# Patient Record
Sex: FEMALE | Race: WHITE | NOT HISPANIC OR LATINO | Employment: STUDENT | URBAN - METROPOLITAN AREA
[De-identification: names, ages, dates, MRNs, and addresses within clinical notes are randomized per-mention and may not be internally consistent; named-entity substitution may affect disease eponyms.]

---

## 2018-11-17 ENCOUNTER — OFFICE VISIT (OUTPATIENT)
Dept: URGENT CARE | Facility: CLINIC | Age: 23
End: 2018-11-17
Payer: COMMERCIAL

## 2018-11-17 VITALS
WEIGHT: 138 LBS | OXYGEN SATURATION: 99 % | DIASTOLIC BLOOD PRESSURE: 83 MMHG | HEIGHT: 64 IN | RESPIRATION RATE: 16 BRPM | HEART RATE: 79 BPM | BODY MASS INDEX: 23.56 KG/M2 | SYSTOLIC BLOOD PRESSURE: 142 MMHG | TEMPERATURE: 97.8 F

## 2018-11-17 DIAGNOSIS — J01.90 ACUTE SINUSITIS, RECURRENCE NOT SPECIFIED, UNSPECIFIED LOCATION: Primary | ICD-10-CM

## 2018-11-17 PROCEDURE — 99203 OFFICE O/P NEW LOW 30 MIN: CPT | Performed by: PHYSICIAN ASSISTANT

## 2018-11-17 RX ORDER — AMOXICILLIN 875 MG/1
875 TABLET, COATED ORAL 2 TIMES DAILY
Qty: 20 TABLET | Refills: 0 | Status: SHIPPED | OUTPATIENT
Start: 2018-11-17 | End: 2018-11-27

## 2018-11-17 RX ORDER — FEXOFENADINE HCL 180 MG/1
180 TABLET ORAL DAILY
COMMUNITY

## 2018-11-17 NOTE — PROGRESS NOTES
[facility]"[employer] Now    NAME: Estephania Strickland is a 26 y o  female  : 1995    MRN: [id number]  DATE: 2018  TIME: 8:30 AM    Assessment and Plan   Acute sinusitis, recurrence not specified, unspecified location [J01 90]  1  Acute sinusitis, recurrence not specified, unspecified location  amoxicillin (AMOXIL) 875 mg tablet       Patient Instructions     Patient Instructions   I have prescribed an antibiotic for the infection  Please take the antibiotic as prescribed and finish the entire prescription  I recommend that the patient takes an over the counter probiotic or eats yogurt with live cultures in it [place]) to keep good bacteria in the gut and help prevent diarrhea  Wash hands frequently to prevent the spread of infection  Can use over the counter cough and cold medications to help with symptoms  Ibuprofen and/or tylenol as needed for pain or fever  If not improving over the next 7-10 days, follow up with PCP  Chief Complaint     Chief Complaint   Patient presents with    Cough    Cold Like Symptoms       History of Present Illness   53-year-old female here with complaint of ongoing cough and cold symptoms for the last 2 weeks  Has been taking her Allegra for her allergies but does not seem to be helping  Cough is persistent  Worse with laying down  Has constant postnasal drip  No fever or chills  Review of Systems   Review of Systems   Constitutional: Negative for activity change, appetite change, chills, diaphoresis, fatigue, fever and unexpected weight change  HENT: Positive for congestion, postnasal drip, sinus pressure and sore throat  Negative for dental problem, hearing loss, sneezing, tinnitus, trouble swallowing and voice change  Eyes: Negative for photophobia, redness and visual disturbance  Respiratory: Positive for cough  Negative for apnea, chest tightness, shortness of breath, wheezing and stridor      Cardiovascular: Negative for chest pain, palpitations and leg swelling  Gastrointestinal: Negative for abdominal distention, abdominal pain, blood in stool, constipation, diarrhea, nausea and vomiting  Endocrine: Negative for cold intolerance, heat intolerance, polydipsia, polyphagia and polyuria  Genitourinary: Negative for difficulty urinating, dysuria, flank pain, frequency, hematuria and urgency  Musculoskeletal: Negative for arthralgias, back pain, gait problem, joint swelling, myalgias, neck pain and neck stiffness  Skin: Negative for pallor, rash and wound  Neurological: Negative for dizziness, tremors, seizures, speech difficulty, weakness and headaches  Hematological: Negative for adenopathy  Does not bruise/bleed easily  Psychiatric/Behavioral: Negative for agitation, confusion, dysphoric mood and sleep disturbance  The patient is not nervous/anxious  All other systems reviewed and are negative  Current Medications     Current Outpatient Prescriptions:     fexofenadine (ALLEGRA) 180 MG tablet, Take 180 mg by mouth daily, Disp: , Rfl:     mometasone (ASMANEX TWISTHALER) 220 MCG/INH inhaler, Inhale 1 puff every evening Rinse mouth after use , Disp: , Rfl:     amoxicillin (AMOXIL) 875 mg tablet, Take 1 tablet (875 mg total) by mouth 2 (two) times a day for 10 days, Disp: 20 tablet, Rfl: 0    Current Allergies     Allergies as of 11/17/2018    (No Known Allergies)          The following portions of the patient's history were reviewed and updated as appropriate: allergies, current medications, past family history, past medical history, past social history, past surgical history and problem list    Past Medical History:   Diagnosis Date    Allergic     Asthma      History reviewed  No pertinent surgical history  History reviewed  No pertinent family history    Social History     Social History    Marital status: Single     Spouse name: N/A    Number of children: N/A    Years of education: N/A     Occupational History  Not on file  Social History Main Topics    Smoking status: Not on file    Smokeless tobacco: Not on file    Alcohol use Not on file    Drug use: Unknown    Sexual activity: Not on file     Other Topics Concern    Not on file     Social History Narrative    No narrative on file     Medications have been verified  Objective   /83   Pulse 79   Temp 97 8 °F (36 6 °C)   Resp 16   Ht 5' 4" (1 626 m)   Wt 62 6 kg (138 lb)   SpO2 99%   BMI 23 69 kg/m²      Physical Exam   Physical Exam   Constitutional: She appears well-developed and well-nourished  No distress  HENT:   Head: Normocephalic  Right Ear: Tympanic membrane and external ear normal    Left Ear: Tympanic membrane and external ear normal    Nose: Mucosal edema present  Right sinus exhibits maxillary sinus tenderness  Left sinus exhibits maxillary sinus tenderness  Mouth/Throat: Posterior oropharyngeal erythema present  No oropharyngeal exudate  Neck: Normal range of motion  Neck supple  Cardiovascular: Normal rate, regular rhythm and normal heart sounds  No murmur heard  Pulmonary/Chest: Effort normal and breath sounds normal  No respiratory distress  She has no wheezes  She has no rales  Abdominal: Soft  Bowel sounds are normal  There is no tenderness  Musculoskeletal: Normal range of motion  Lymphadenopathy:     She has no cervical adenopathy  Skin: Skin is warm  No rash noted  Nursing note and vitals reviewed

## 2019-08-26 ENCOUNTER — OFFICE VISIT (OUTPATIENT)
Dept: OBGYN CLINIC | Facility: CLINIC | Age: 24
End: 2019-08-26
Payer: COMMERCIAL

## 2019-08-26 VITALS
WEIGHT: 140.8 LBS | SYSTOLIC BLOOD PRESSURE: 120 MMHG | DIASTOLIC BLOOD PRESSURE: 70 MMHG | BODY MASS INDEX: 23.46 KG/M2 | HEIGHT: 65 IN

## 2019-08-26 DIAGNOSIS — Z12.4 CERVICAL CANCER SCREENING: ICD-10-CM

## 2019-08-26 DIAGNOSIS — Z30.41 ORAL CONTRACEPTIVE USE: ICD-10-CM

## 2019-08-26 DIAGNOSIS — Z01.419 ENCOUNTER FOR GYNECOLOGICAL EXAMINATION WITHOUT ABNORMAL FINDING: Primary | ICD-10-CM

## 2019-08-26 PROCEDURE — 99385 PREV VISIT NEW AGE 18-39: CPT | Performed by: OBSTETRICS & GYNECOLOGY

## 2019-08-26 PROCEDURE — G0145 SCR C/V CYTO,THINLAYER,RESCR: HCPCS | Performed by: OBSTETRICS & GYNECOLOGY

## 2019-08-26 RX ORDER — EPINEPHRINE 0.3 MG/.3ML
0.3 INJECTION SUBCUTANEOUS
COMMUNITY
Start: 2018-11-21

## 2019-08-26 RX ORDER — ALBUTEROL SULFATE 90 UG/1
2 AEROSOL, METERED RESPIRATORY (INHALATION) EVERY 4 HOURS PRN
COMMUNITY
Start: 2018-11-21

## 2019-08-26 NOTE — PROGRESS NOTES
CC:  Annual exam    HPI: Reginald Levin presents for routine yearly visit  She is doing well and expresses no complaints or concerns at this time  She had been taking a birth control pill which she stopped for 1 month simply because she ran out of pills  She would like to restart this that she has had no problems or side effects from it  Past Medical History:  Past Medical History:   Diagnosis Date    Allergic     Asthma        Past Surgical History:  Past Surgical History:   Procedure Laterality Date    WISDOM TOOTH EXTRACTION         Past OB/Gyn History:    Menstrual cycles are regular with normal bleeding  Denies any history of sexually transmitted infection  No history of abnormal pap smears       ALLERGIES:   Allergies   Allergen Reactions    Other      Avocado, almond, cherry, pea and ilia   Not clear if only pollen food syndrome or if at risk for anaphylaxis so epi available just in case  Advised on 11/21/2108 to reintroduce all foods except for ilia  Intermittent hx of mouth itching with apple       MEDS:   Current Outpatient Medications:     albuterol (PROVENTIL HFA,VENTOLIN HFA) 90 mcg/act inhaler    EPINEPHrine (EPIPEN) 0 3 mg/0 3 mL SOAJ    Mometasone Furoate (ASMANEX HFA) 200 MCG/ACT AERO    fexofenadine (ALLEGRA) 180 MG tablet    mometasone (ASMANEX TWISTHALER) 220 MCG/INH inhaler    norethindrone-ethinyl estradiol-iron (ESTROSTEP FE) 1-20/1-30/1-35 MG-MCG TABS    Family History:  Family History   Problem Relation Age of Onset    No Known Problems Mother     No Known Problems Father     No Known Problems Sister     No Known Problems Sister        Social History:  Social History     Socioeconomic History    Marital status: Single     Spouse name: Not on file    Number of children: Not on file    Years of education: Not on file    Highest education level: Not on file   Occupational History    Not on file   Social Needs    Financial resource strain: Not on file   [place] insecurity:     Worry: Not on file     Inability: Not on file    Transportation needs:     Medical: Not on file     Non-medical: Not on file   Tobacco Use    Smoking status: Never Smoker    Smokeless tobacco: Never Used   Substance and Sexual Activity    Alcohol use: Yes     Frequency: 4 or more times a week     Drinks per session: 1 or 2    Drug use: Never    Sexual activity: Yes     Birth control/protection: Condom Male   Lifestyle    Physical activity:     Days per week: Not on file     Minutes per session: Not on file    Stress: Not on file   Relationships    Social connections:     Talks on phone: Not on file     Gets together: Not on file     Attends Gnosticism service: Not on file     Active member of club or organization: Not on file     Attends meetings of clubs or organizations: Not on file     Relationship status: Not on file    Intimate partner violence:     Fear of current or ex partner: Not on file     Emotionally abused: Not on file     Physically abused: Not on file     Forced sexual activity: Not on file   Other Topics Concern    Not on file   Social History Narrative    Not on file         Review of Systems:  Gen:   Denies fatigue, chills, nausea, vomiting, fever  Skin: No rashes or discolorations of any concern  RESP: Denies SOB, no cough  CV: Denies chest pain or palpitations  Breasts: Denies masses, pain, skin changes and nipple discharge  GI: Denies abdominal pain, heartburn, nausea, vomiting, changes in bowel habits  : Denies dysuria, frequency, CVA tenderness, incontinence and hematuria  Genitalia: Denies abnormal vaginal discharge, external lesions, rashes, pelvic pain, pressure, abnormal bleeding    Rectal:  Denies pain, bleeding, hemorrhoids,    Physical Exam:  /70 (BP Location: Left arm, Patient Position: Sitting, Cuff Size: Standard)   Ht 5' 5" (1 651 m)   Wt 63 9 kg (140 lb 12 8 oz)   LMP 08/01/2019 (Exact Date)   BMI 23 43 kg/m²    Gen: The patient was alert and oriented x3, pleasant well-appearing female in no acute distress  Neck:  Unremarkable, no lymphadenopathy, no thyromegaly, or tenderness  Breasts: Symmetric  No dominant, discrete, fixed  or suspicious masses are noted  No skin or nipple changes  No palpable axillary nodes  No supraclavicular adenopathy  Abd:  Soft, nontender, nondistended, no masses or organomegaly  Back:  No CVA tenderness, no tenderness to palpation along spine  Pelvic  Normal appearing external female genitalia, no visible lesions, no rashes  Vagina is free of discharge, normal vaginal epithelium, no abnormal  lesions, no evidence of prolapse anteriorly or posteriorly  Normal appearing cervix, mobile and nontender  A thin prep pap smear was obtained today  Co testing for Chlamydia and gonorrhea was declined by the patient  Uterus is normal size, mobile and, nontender  No palpable adnexal masses or tenderness  No anoperineal lesions  Skin:  No concerning lesions  Extremeties: No edema      Assessment & Plan:   1  Routine annual exam    Pap smear performed  RTO one year orPRN  2    Contraception, continue patient's birth control pill for an additional year

## 2019-08-29 LAB
LAB AP GYN PRIMARY INTERPRETATION: NORMAL
Lab: NORMAL

## 2021-03-10 DIAGNOSIS — Z23 ENCOUNTER FOR IMMUNIZATION: ICD-10-CM

## 2021-03-18 ENCOUNTER — IMMUNIZATIONS (OUTPATIENT)
Dept: FAMILY MEDICINE CLINIC | Facility: HOSPITAL | Age: 26
End: 2021-03-18

## 2021-03-18 DIAGNOSIS — Z23 ENCOUNTER FOR IMMUNIZATION: Primary | ICD-10-CM

## 2021-03-18 PROCEDURE — 91300 SARS-COV-2 / COVID-19 MRNA VACCINE (PFIZER-BIONTECH) 30 MCG: CPT

## 2021-03-18 PROCEDURE — 0001A SARS-COV-2 / COVID-19 MRNA VACCINE (PFIZER-BIONTECH) 30 MCG: CPT

## 2021-04-10 ENCOUNTER — IMMUNIZATIONS (OUTPATIENT)
Dept: FAMILY MEDICINE CLINIC | Facility: HOSPITAL | Age: 26
End: 2021-04-10

## 2021-04-10 DIAGNOSIS — Z23 ENCOUNTER FOR IMMUNIZATION: Primary | ICD-10-CM

## 2021-04-10 PROCEDURE — 0002A SARS-COV-2 / COVID-19 MRNA VACCINE (PFIZER-BIONTECH) 30 MCG: CPT

## 2021-04-10 PROCEDURE — 91300 SARS-COV-2 / COVID-19 MRNA VACCINE (PFIZER-BIONTECH) 30 MCG: CPT

## 2023-05-15 ENCOUNTER — TELEMEDICINE (OUTPATIENT)
Dept: PSYCHIATRY | Facility: CLINIC | Age: 28
End: 2023-05-15

## 2023-05-15 DIAGNOSIS — F41.1 GENERALIZED ANXIETY DISORDER: Primary | ICD-10-CM

## 2023-05-15 NOTE — PSYCH
Assessment/Plan:      Diagnoses and all orders for this visit:    Generalized anxiety disorder          Subjective:  Farheen King reports increased anxiety 'lately' and wants to build confidence  Farheen King said 'looking back' she has struggled with anxiety for 'as long as I can remember '  In middle and high school she would struggle with worry  Farheen King reports struggling with wanting to be perfect  Farheen King has difficulty 'being present in the moment' and getting paperwork completed  PHQ9- mild range  GAD7-severe range  Patient ID: Claribel Mcdowell is a 24 y o  female  HPI:     Pre-morbid level of function and History of Present Illness: anxiety started in middle school  Previous Psychiatric/psychological treatment/year: past counseling in [id number]; it was helpful, she needed help with transitioning to college  Current Psychiatrist/Therapist: none  Outpatient and/or Partial and Other [employer] Used ([facility], [facility], [facility]): Outpatient [id number]   Goal for therapy is to learn more strategies to cope with anxiety  Problem Assessment:     SOCIAL/VOCATION:  Family Constellation (include parents, relationship with each and pertinent Psych/Medical History):     Family History   Problem Relation Age of Onset   • No Known Problems Mother    • No Known Problems Father    • No Known Problems Sister    • No Known Problems Sister        Mother: living,  to father; good childhood overall  Spouse: not    Father: living,  to mother   Children: none   Sibling: two half sisters that are older; 'pretty close' they live in [place]   Other: boyfriend of almost 10 years     Farheen King relates best to mom  she lives with boyfriend  she does not live alone  Domestic Violence: No past history of domestic violence and There is no history of child abuse    Additional Comments related to family/relationships/peer support: none       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 No    FUNCTIONAL STATUS: There has been a recent change in [place] ability to do the following: none    Level of Assistance Needed/By Whom?: n/a    SUBSTANCE ABUSE ASSESSMENT: no substance abuse    Substance/Route/Age/Amount/Frequency/Last Use: socially, alcohol    DETOX HISTORY: n/a    Previous detox/rehab treatment: n/a    HEALTH ASSESSMENT: no referral to PCP needed; does have asthma, migraines from 'time to time'    LEGAL: none      Risk Assessment:   The following ratings are based on my interview(s) with Lynn    Risk of Harm to Self:   Demographic risk factors include   Historical Risk Factors include none  Recent Specific Risk Factors include none  Additional Factors for a Child or Adolescent none    Risk of Harm to Others:   Demographic Risk Factors include none  Historical Risk Factors include none  Recent Specific Risk Factors include none    Access to Weapons:   Patience has access to the following weapons: none  The following steps have been taken to ensure weapons are properly secured: n/a    Based on the above information, the client presents the following risk of harm to self or others:  low    The following interventions are recommended:   no intervention changes    Notes regarding this Risk Assessment: no past SI, suicide attempts or self harm            Review Of Systems:     Mood Normal   Behavior Normal    Thought Content Normal   General Normal    Personality Normal   Other Psych Symptoms Normal   Constitutional As Noted in HPI   ENT As Noted in HPI Cardiovascular As Noted in HPI   Respiratory As Noted in HPI   Gastrointestinal As Noted in HPI   Genitourinary As Noted in HPI   Musculoskeletal As Noted in HPI   Integumentary As Noted in HPI   Neurological As Noted in HPI   Endocrine Normal          Mental status:  Appearance calm and cooperative  and good eye contact    Mood mood appropriate   Affect affect appropriate    Speech a normal rate   Thought Processes normal thought processes   Hallucinations no hallucinations present    Thought Content no delusions   Abnormal Thoughts no suicidal thoughts  and no homicidal thoughts    Orientation  oriented to person, oriented to place and oriented to time   Remote Memory short term memory intact and long term memory intact   Attention Span concentration intact   Intellect Appears to be of Average Intelligence   Fund of Knowledge displays adequate knowledge of current events   Insight Insight intact   Judgement judgment was intact                     Visit start and stop times:    05/15/23  Start Time: 1204  Stop Time: 1234  Total Visit Time: 30 minutes    Virtual Regular Visit    Verification of patient location:    Patient is located at Home in the following state in which I hold an active license [place]      Assessment/Plan:    Problem List Items Addressed This Visit        Other    Generalized anxiety disorder - Primary       Goals addressed in session: Goal 1          Reason for visit is No chief complaint on file  Encounter provider Fatou Rey LCSW    Provider located at [address] [place] [id number] [phone]      Recent Visits  No visits were found meeting these conditions    Showing recent visits within past 7 days and meeting all other requirements  Today's Visits  Date Type Provider Dept   05/15/23 Telemedicine Fatou Rey LCSW [facility]   Showing today's visits and meeting all other requirements  Future Appointments  No visits were found meeting these conditions  Showing future appointments within next 150 days and meeting all other requirements       The patient was identified by name and date of birth  Love Gross was informed that this is a telemedicine visit and that the visit is being conducted throughthe [employer] platform  She agrees to proceed     My office door was closed  No one else was in the room  She acknowledged consent and understanding of privacy and security of the video platform  The patient has agreed to participate and understands they can discontinue the visit at any time  Patient is aware this is a billable service  Danielle Mueller is a 24 y o  female  HPI     Past Medical History:   Diagnosis Date   • Allergic    • Asthma        Past Surgical History:   Procedure Laterality Date   • WISDOM TOOTH EXTRACTION         Current Outpatient Medications   Medication Sig Dispense Refill   • albuterol (PROVENTIL HFA,VENTOLIN HFA) 90 mcg/act inhaler Inhale 2 puffs every 4 (four) hours as needed     • EPINEPHrine (EPIPEN) 0 3 mg/0 3 mL SOAJ Inject 0 3 mg into a muscle     • fexofenadine (ALLEGRA) 180 MG tablet Take 180 mg by mouth daily     • mometasone (ASMANEX TWISTHALER) 220 MCG/INH inhaler Inhale 1 puff every evening Rinse mouth after use  • Mometasone Furoate (ASMANEX HFA) 200 MCG/ACT AERO Inhale 400 mcg 2 (two) times a day     • norethindrone-ethinyl estradiol-iron (ESTROSTEP FE) 1-20/1-30/1-35 MG-MCG TABS Take 1 tablet by mouth daily for 84 days 84 tablet 3     No current facility-administered medications for this visit          Allergies   Allergen Reactions   • Other      Avocado, almond, cherry, pea and ilia   Not clear if only pollen food syndrome or if at risk for anaphylaxis so epi available just in case  Advised on 11/21/2108 to reintroduce all foods except for ilia  Intermittent hx of mouth itching with apple Review of Systems    Video Exam    There were no vitals filed for this visit      Physical Exam

## 2023-05-15 NOTE — BH TREATMENT PLAN
Brayan 69  1995     Date of Initial Psychotherapy Assessment: 5/15/23   Date of Current Treatment Plan: 05/15/23  Treatment Plan Target Date: 11/15/23  Treatment Plan Expiration Date: 11/15/23    Diagnosis:   1  Generalized anxiety disorder            Area(s) of Need: strategies to manage anxiety at work     Long Term Goal 1 (in the client's own words): to learn strategies to manage anxiety    Stage of Change: Action    Target Date for completion: 11/15/23     Anticipated therapeutic modalities: CBT, DBT, skills training, psycho-education     People identified to complete this goal: Lynn     Objective 1: (identify the means of measuring success in meeting the objective):  1  Identify 3-4  stressors contributing to anxiety and 3-4 healthy ways to manage anxiety, as well as begin utilizing them  2  Engage in discussion of the Cognitive Process with clinician  3  Identify 2-3 Cognitive Distortions often used that reinforce anxiety and engage in Reframing Cognitive Distortions  4  Engage in Problem Solving as needed  5  Improve communication skills as needed  6  Clinician to provide Psychoeducation as needed  7  Teach DBT skills as appropriate  I am currently under the care of a [facility] psychiatric provider: no    My [facility] psychiatric provider is: none    I am currently taking psychiatric medications: No    I feel that I will be ready for discharge from mental health care when I reach the following (measurable goal/objective): When strategies are learned and anxiety is better managed  For children and adults who have a legal guardian:   Has there been any change to custody orders and/or guardianship status? NA  If yes, attach updated documentation  I have not created my Crisis Plan and have been offered a copy of this plan  Will do crisis plan during next session       [address]: Diagnosis and Treatment Plan explained to Taylor Hanson  89  acknowledges an understanding of their diagnosis  Olga Blas agrees to this treatment plan  I have been offered a copy of this Treatment Plan  Yes    Olga Blas, 1995, actively participated in the creation of this treatment plan during a virtual session, using the [employer] platform  Olga Blas  provided verbal consent on 5/15/2023 at 200 PM  The treatment plan was transcribed into the [employer] Record at a later time

## 2023-06-05 ENCOUNTER — TELEPHONE (OUTPATIENT)
Dept: PSYCHIATRY | Facility: CLINIC | Age: 28
End: 2023-06-05

## 2023-06-05 ENCOUNTER — DOCUMENTATION (OUTPATIENT)
Dept: PSYCHIATRY | Facility: CLINIC | Age: 28
End: 2023-06-05

## 2023-06-05 DIAGNOSIS — F41.1 GENERALIZED ANXIETY DISORDER: Primary | ICD-10-CM

## 2023-06-05 NOTE — TELEPHONE ENCOUNTER
DISCHARGE LETTER for DIDI SPEARS (certified and regular) placed in outgoing mail on 06/05/23      Article #:  [id number]    Address:  [address] [place] [address] [address] [place] [id number]

## 2023-06-05 NOTE — PROGRESS NOTES
Psychotherapy Discharge Summary    Preferred Name: Easton Mckeon  YOB: 1995    Admission date to psychotherapy: 5/15/23    Referred by: self    Presenting Problem: anxiety    Course of treatment included : only completed BH assessment, no f/u sessions    Progress/Outcome of Treatment Goals (brief summary of course of treatment) no goals completed due to short course of treatment  Treatment Complications (if any): patient moved to [place]  Treatment Progress: fair    Current [facility] Psychiatric Provider: none    Discharge Medications include: none    Discharge Date: 5/15/23    Discharge Diagnosis:   1  Generalized anxiety disorder            Criteria for Discharge: patient moved to [place]; this therapist not licensed in that state, patient will look for new therapist    Aftercare recommendations include (include specific referral names and phone numbers, if appropriate): start outpatient therapy once in [place]        Prognosis: fair

## 2025-06-02 DIAGNOSIS — Z00.6 ENCOUNTER FOR EXAMINATION FOR NORMAL COMPARISON OR CONTROL IN CLINICAL RESEARCH PROGRAM: ICD-10-CM

## 2025-07-25 ENCOUNTER — APPOINTMENT (OUTPATIENT)
Dept: LAB | Facility: CLINIC | Age: 30
End: 2025-07-25

## 2025-07-25 DIAGNOSIS — Z00.8 HEALTH EXAMINATION IN POPULATION SURVEY: Primary | ICD-10-CM

## 2025-07-25 DIAGNOSIS — Z00.6 ENCOUNTER FOR EXAMINATION FOR NORMAL COMPARISON OR CONTROL IN CLINICAL RESEARCH PROGRAM: ICD-10-CM

## 2025-07-25 PROCEDURE — 36415 COLL VENOUS BLD VENIPUNCTURE: CPT

## 2025-08-09 LAB
APOB+LDLR+PCSK9 GENE MUT ANL BLD/T: NOT DETECTED
BRCA1+BRCA2 DEL+DUP + FULL MUT ANL BLD/T: NOT DETECTED
MLH1+MSH2+MSH6+PMS2 GN DEL+DUP+FUL M: NOT DETECTED